# Patient Record
Sex: MALE | Race: WHITE | NOT HISPANIC OR LATINO | Employment: FULL TIME | URBAN - METROPOLITAN AREA
[De-identification: names, ages, dates, MRNs, and addresses within clinical notes are randomized per-mention and may not be internally consistent; named-entity substitution may affect disease eponyms.]

---

## 2018-07-03 ENCOUNTER — OFFICE VISIT (OUTPATIENT)
Dept: FAMILY MEDICINE CLINIC | Facility: CLINIC | Age: 43
End: 2018-07-03
Payer: COMMERCIAL

## 2018-07-03 VITALS
SYSTOLIC BLOOD PRESSURE: 150 MMHG | HEART RATE: 72 BPM | DIASTOLIC BLOOD PRESSURE: 88 MMHG | RESPIRATION RATE: 16 BRPM | HEIGHT: 72 IN | BODY MASS INDEX: 27.5 KG/M2 | OXYGEN SATURATION: 98 % | WEIGHT: 203 LBS | TEMPERATURE: 97.9 F

## 2018-07-03 DIAGNOSIS — R10.84 GENERALIZED ABDOMINAL PAIN: Primary | ICD-10-CM

## 2018-07-03 LAB
ALBUMIN SERPL BCP-MCNC: 4.1 G/DL (ref 3.5–5)
ALP SERPL-CCNC: 88 U/L (ref 46–116)
ALT SERPL W P-5'-P-CCNC: 74 U/L (ref 12–78)
AMYLASE SERPL-CCNC: 38 IU/L (ref 25–115)
ANION GAP SERPL CALCULATED.3IONS-SCNC: 8 MMOL/L (ref 4–13)
AST SERPL W P-5'-P-CCNC: 27 U/L (ref 5–45)
BASOPHILS # BLD AUTO: 0.04 THOUSANDS/ΜL (ref 0–0.1)
BASOPHILS NFR BLD AUTO: 1 % (ref 0–1)
BILIRUB SERPL-MCNC: 0.63 MG/DL (ref 0.2–1)
BUN SERPL-MCNC: 12 MG/DL (ref 5–25)
CALCIUM SERPL-MCNC: 9 MG/DL (ref 8.3–10.1)
CHLORIDE SERPL-SCNC: 106 MMOL/L (ref 100–108)
CO2 SERPL-SCNC: 26 MMOL/L (ref 21–32)
CREAT SERPL-MCNC: 0.88 MG/DL (ref 0.6–1.3)
EOSINOPHIL # BLD AUTO: 0.13 THOUSAND/ΜL (ref 0–0.61)
EOSINOPHIL NFR BLD AUTO: 2 % (ref 0–6)
ERYTHROCYTE [DISTWIDTH] IN BLOOD BY AUTOMATED COUNT: 12.3 % (ref 11.6–15.1)
GFR SERPL CREATININE-BSD FRML MDRD: 107 ML/MIN/1.73SQ M
GLUCOSE SERPL-MCNC: 103 MG/DL (ref 65–140)
HCT VFR BLD AUTO: 42.8 % (ref 36.5–49.3)
HGB BLD-MCNC: 14.4 G/DL (ref 12–17)
IMM GRANULOCYTES # BLD AUTO: 0.03 THOUSAND/UL (ref 0–0.2)
IMM GRANULOCYTES NFR BLD AUTO: 1 % (ref 0–2)
LYMPHOCYTES # BLD AUTO: 1.58 THOUSANDS/ΜL (ref 0.6–4.47)
LYMPHOCYTES NFR BLD AUTO: 26 % (ref 14–44)
MCH RBC QN AUTO: 31 PG (ref 26.8–34.3)
MCHC RBC AUTO-ENTMCNC: 33.6 G/DL (ref 31.4–37.4)
MCV RBC AUTO: 92 FL (ref 82–98)
MONOCYTES # BLD AUTO: 0.7 THOUSAND/ΜL (ref 0.17–1.22)
MONOCYTES NFR BLD AUTO: 11 % (ref 4–12)
NEUTROPHILS # BLD AUTO: 3.72 THOUSANDS/ΜL (ref 1.85–7.62)
NEUTS SEG NFR BLD AUTO: 59 % (ref 43–75)
NRBC BLD AUTO-RTO: 0 /100 WBCS
PLATELET # BLD AUTO: 200 THOUSANDS/UL (ref 149–390)
PMV BLD AUTO: 9.4 FL (ref 8.9–12.7)
POTASSIUM SERPL-SCNC: 4.1 MMOL/L (ref 3.5–5.3)
PROT SERPL-MCNC: 7.3 G/DL (ref 6.4–8.2)
RBC # BLD AUTO: 4.64 MILLION/UL (ref 3.88–5.62)
SODIUM SERPL-SCNC: 140 MMOL/L (ref 136–145)
WBC # BLD AUTO: 6.2 THOUSAND/UL (ref 4.31–10.16)

## 2018-07-03 PROCEDURE — 1036F TOBACCO NON-USER: CPT | Performed by: FAMILY MEDICINE

## 2018-07-03 PROCEDURE — 36415 COLL VENOUS BLD VENIPUNCTURE: CPT | Performed by: FAMILY MEDICINE

## 2018-07-03 PROCEDURE — 99203 OFFICE O/P NEW LOW 30 MIN: CPT | Performed by: FAMILY MEDICINE

## 2018-07-03 PROCEDURE — 82150 ASSAY OF AMYLASE: CPT | Performed by: FAMILY MEDICINE

## 2018-07-03 PROCEDURE — 85025 COMPLETE CBC W/AUTO DIFF WBC: CPT | Performed by: FAMILY MEDICINE

## 2018-07-03 PROCEDURE — 80053 COMPREHEN METABOLIC PANEL: CPT | Performed by: FAMILY MEDICINE

## 2018-07-03 NOTE — PROGRESS NOTES
Assessment/Plan:    Patient is a 68-year-old male with complaint of abdominal discomfort and feeling that he may have a hernia  He is tender on palpation and I do palpate a bulging or protrudes above the umbilicus  It does not really appear as a ventral hernia  I will 1st check blood work and then we may need to order a CT scan  Diagnoses and all orders for this visit:    Generalized abdominal pain  -     CBC and differential  -     Comprehensive metabolic panel  -     Amylase          Subjective:      Patient ID: Derick Suero is a 38 y o  male  Patient complains of abdominal burring, bloating  He also feels two hernias in the umbilicus  He used to have regular BM's but past two months, alternates between diarrhea and constipation  Drinks less than four alcoholic drinks a week  Stopped smoking 8 years ago  Grandmother and father have history of diverticulitis  Both of Mom's parents had heart disease and mother had breast cancer  The following portions of the patient's history were reviewed and updated as appropriate: allergies, current medications, past family history, past medical history, past social history, past surgical history and problem list     Review of Systems   Constitutional: Negative for chills and fever  HENT: Negative for congestion and sinus pain  Respiratory: Negative for cough  Gastrointestinal: Positive for abdominal distention, constipation and diarrhea  Negative for nausea and vomiting  Genitourinary: Negative for difficulty urinating  Skin: Negative  Neurological: Negative for dizziness and headaches  Hematological: Negative  Psychiatric/Behavioral: Negative            Objective:      /88 (BP Location: Left arm, Patient Position: Sitting, Cuff Size: Standard)   Pulse 72   Temp 97 9 °F (36 6 °C) (Tympanic)   Resp 16   Ht 6' (1 829 m)   Wt 92 1 kg (203 lb)   SpO2 98%   BMI 27 53 kg/m²          Physical Exam   Constitutional: He is oriented to person, place, and time  He appears well-developed  No distress  Neck: No thyromegaly present  Cardiovascular: Normal rate, regular rhythm and normal heart sounds  Pulmonary/Chest: Effort normal and breath sounds normal    Abdominal: Soft  He exhibits mass (protruberance of mid-abdomen palpated)  There is tenderness (Tender on palpationn of upper abdomen  )  Lymphadenopathy:     He has no cervical adenopathy  Neurological: He is alert and oriented to person, place, and time  Skin: Skin is warm  Psychiatric: He has a normal mood and affect  Nursing note and vitals reviewed

## 2018-07-05 DIAGNOSIS — R22.2 ABDOMINAL WALL MASS: ICD-10-CM

## 2018-07-05 DIAGNOSIS — R19.4 CHANGE IN BOWEL HABITS: ICD-10-CM

## 2018-07-05 DIAGNOSIS — R10.33 PERIUMBILICAL ABDOMINAL PAIN: Primary | ICD-10-CM

## 2018-07-10 ENCOUNTER — DOCUMENTATION (OUTPATIENT)
Dept: FAMILY MEDICINE CLINIC | Facility: CLINIC | Age: 43
End: 2018-07-10

## 2018-07-10 NOTE — PROGRESS NOTES
Isreal Sosa # [id number], EXP 8/8/18  PT TO SCHEDULE AT [facility], BARIUM UP FRONT FOR P/U

## 2018-07-13 ENCOUNTER — HOSPITAL ENCOUNTER (OUTPATIENT)
Dept: CT IMAGING | Facility: HOSPITAL | Age: 43
Discharge: HOME | End: 2018-07-13
Payer: COMMERCIAL

## 2018-07-13 DIAGNOSIS — R10.33 PERIUMBILICAL ABDOMINAL PAIN: ICD-10-CM

## 2018-07-13 DIAGNOSIS — R22.2 ABDOMINAL WALL MASS: ICD-10-CM

## 2018-07-13 DIAGNOSIS — R19.4 CHANGE IN BOWEL HABITS: ICD-10-CM

## 2018-07-13 PROCEDURE — 74177 CT ABD & PELVIS W/CONTRAST: CPT

## 2018-07-13 RX ADMIN — IOHEXOL 100 ML: 350 INJECTION, SOLUTION INTRAVENOUS at 19:17

## 2018-07-18 ENCOUNTER — TELEPHONE (OUTPATIENT)
Dept: FAMILY MEDICINE CLINIC | Facility: CLINIC | Age: 43
End: 2018-07-18

## 2018-07-18 DIAGNOSIS — K42.9 UMBILICAL HERNIA WITHOUT OBSTRUCTION AND WITHOUT GANGRENE: Primary | ICD-10-CM

## 2018-08-21 ENCOUNTER — OFFICE VISIT (OUTPATIENT)
Dept: SURGERY | Facility: MEDICAL CENTER | Age: 43
End: 2018-08-21
Payer: COMMERCIAL

## 2018-08-21 VITALS
BODY MASS INDEX: 27.5 KG/M2 | HEIGHT: 72 IN | TEMPERATURE: 97.3 F | SYSTOLIC BLOOD PRESSURE: 146 MMHG | DIASTOLIC BLOOD PRESSURE: 90 MMHG | WEIGHT: 203 LBS

## 2018-08-21 DIAGNOSIS — K42.9 UMBILICAL HERNIA WITHOUT OBSTRUCTION AND WITHOUT GANGRENE: ICD-10-CM

## 2018-08-21 PROCEDURE — 99243 OFF/OP CNSLTJ NEW/EST LOW 30: CPT | Performed by: SURGERY

## 2018-08-21 NOTE — PROGRESS NOTES
Assessment/Plan: Maryan Tobar is a 38year old male who presents today, per referral by Dr Sherrill Meadows, for consultation regarding an umbilical hernia  CT scan done 7/13/18  Physical exam revealed a small fat-containing umbilical hernia  CT scan shows there are no intestines in the hernia at this time  Discussed risks, benefits, and alternatives to open umbilical hernia repair with or without mesh  Explained the surgery as well as pre- and post-procedural protocol and restrictions  No heavy lifting greater than 20 pounds and no strenuous activity for a full month after surgery  Encouraged him to walk to aid in his recovery  He will consider his options  He knows to call if any questions or concerns arise  Abdominal distention- Explained that his bloating after eating is likely related to diet, and would likely not be alleviated by hernia repair  No problem-specific Assessment & Plan notes found for this encounter  Diagnoses and all orders for this visit:    Umbilical hernia without obstruction and without gangrene  -     Ambulatory referral to General Surgery          Subjective:      Patient ID: Maryan Tobar is a 38 y o  male  Maryan Tobar is a 38year old male who presents today, per referral by Dr Sherrill Meadows, for consultation regarding an umbilical hernia  CT scan done 7/13/18  He has had it for at least 2 years  He reports it used to go in and out, but now it's usually out, and it is causing him discomfort  Abdominal distention- He also notes that when he eats he becomes very bloated  He says that it is becoming a bigger problem for him as his hernia gets larger  He has not noticed an association with food  He also feels a pain in his left lower quadrant occasionally  When his abdomen is pushed on he reports he feels "something pushing back"  He is "unable to eat a lot" now  The only change in his diet has been a reduction of animal products in the last year  At first anything made him bloat   Now even less food makes him bloat  The following portions of the patient's history were reviewed and updated as appropriate: allergies, current medications, past family history, past medical history, past social history, past surgical history and problem list     Review of Systems   Constitutional: Negative  HENT: Negative  Eyes: Negative  Respiratory: Negative  Cardiovascular: Negative  Gastrointestinal: Positive for abdominal distention and abdominal pain  Endocrine: Negative  Genitourinary: Negative  Musculoskeletal: Negative  Skin: Negative  Allergic/Immunologic: Negative  Neurological: Negative  Hematological: Negative  Psychiatric/Behavioral: Negative  All other systems reviewed and are negative  Objective:      /90   Temp (!) 97 3 °F (36 3 °C)   Ht 6' (1 829 m)   Wt 92 1 kg (203 lb)   BMI 27 53 kg/m²          Physical Exam   Constitutional: He is oriented to person, place, and time  He appears well-developed and well-nourished  No distress  HENT:   Head: Normocephalic and atraumatic  Right Ear: External ear normal    Left Ear: External ear normal    Nose: Nose normal    Mouth/Throat: Oropharynx is clear and moist  No oropharyngeal exudate  Eyes: Conjunctivae and EOM are normal  Right eye exhibits no discharge  Left eye exhibits no discharge  No scleral icterus  Neck: Normal range of motion  Neck supple  No JVD present  No tracheal deviation present  No thyromegaly present  Cardiovascular: Normal rate, regular rhythm, normal heart sounds and intact distal pulses  Exam reveals no gallop and no friction rub  No murmur heard  Pulmonary/Chest: Effort normal and breath sounds normal  No stridor  No respiratory distress  He has no wheezes  He has no rales  He exhibits no tenderness  Abdominal: Soft  Bowel sounds are normal  He exhibits no distension and no mass  There is no tenderness  There is no rebound and no guarding     Small umbilical hernia  Musculoskeletal: Normal range of motion  He exhibits no edema, tenderness or deformity  Lymphadenopathy:     He has no cervical adenopathy  Neurological: He is alert and oriented to person, place, and time  No cranial nerve deficit  Coordination normal    Skin: Skin is dry  No rash noted  He is not diaphoretic  No erythema  No pallor  Psychiatric: He has a normal mood and affect  His behavior is normal  Thought content normal    Nursing note and vitals reviewed  By signing my name below, Veronica Sandoval, attest that this documentation has been prepared under the direction and in the presence of Robby Montgomery MD  Electronically Signed: Mark Mig[address] 8/21/2018  Gildardo Carrillo, personally performed the services described in this documentation  All medical record entries made by the scribe were at my direction and in my presence  I have reviewed the chart and discharge instructions (if applicable) and agree that the record reflects my personal performance and is accurate and complete  Robby Montgomery MD  8/21/2018

## 2018-08-21 NOTE — LETTER
August 22, 2018     Sheri Figueroa DO  [address]    Patient: Darlene Rios   YOB: 1975   Date of Visit: 8/21/2018       Dear Dr Aryan Bolton: Thank you for referring Darlene Rios to me for evaluation  Below are my notes for this consultation  If you have questions, please do not hesitate to call me  I look forward to following your patient along with you  Sincerely,        Estee Menon MD        CC: No Recipients  Sander Lazo  8/21/2018  1:40 PM  Sign at close encounter  Assessment/Plan:    No problem-specific Assessment & Plan notes found for this encounter  Diagnoses and all orders for this visit:    Umbilical hernia without obstruction and without gangrene  -     Ambulatory referral to General Surgery          Subjective:      Patient ID: Darlene Rios is a 38 y o  male  Darlene Rios is a 38year old male who presents today, per referral by Dr Sheri Figueroa, for consultation re        The following portions of the patient's history were reviewed and updated as appropriate: allergies, current medications, past family history, past medical history, past social history, past surgical history and problem list     Review of Systems      Objective: There were no vitals taken for this visit  Physical Exam      By signing my name below, Elin Jansen, attsharmila that this documentation has been prepared under the direction and in the presence of Estee Menon MD  Electronically Signed: Sander Lazo 8/21/2018  Dorene Rothman, personally performed the services described in this documentation  All medical record entries made by the sander were at my direction and in my presence  I have reviewed the chart and discharge instructions (if applicable) and agree that the record reflects my personal performance and is accurate and complete  Estee Menon MD  8/21/2018

## 2021-01-13 ENCOUNTER — OFFICE VISIT (OUTPATIENT)
Dept: FAMILY MEDICINE CLINIC | Facility: CLINIC | Age: 46
End: 2021-01-13
Payer: COMMERCIAL

## 2021-01-13 VITALS
WEIGHT: 214.4 LBS | OXYGEN SATURATION: 97 % | HEART RATE: 97 BPM | BODY MASS INDEX: 29.04 KG/M2 | HEIGHT: 72 IN | DIASTOLIC BLOOD PRESSURE: 88 MMHG | SYSTOLIC BLOOD PRESSURE: 140 MMHG | TEMPERATURE: 98.6 F | RESPIRATION RATE: 17 BRPM

## 2021-01-13 DIAGNOSIS — I49.9 CARDIAC ARRHYTHMIA, UNSPECIFIED CARDIAC ARRHYTHMIA TYPE: ICD-10-CM

## 2021-01-13 DIAGNOSIS — R93.5 ABNORMAL CT OF THE ABDOMEN: ICD-10-CM

## 2021-01-13 DIAGNOSIS — Z13.1 SCREENING FOR DIABETES MELLITUS: ICD-10-CM

## 2021-01-13 DIAGNOSIS — M25.562 CHRONIC PAIN OF LEFT KNEE: ICD-10-CM

## 2021-01-13 DIAGNOSIS — Z13.220 ENCOUNTER FOR LIPID SCREENING FOR CARDIOVASCULAR DISEASE: ICD-10-CM

## 2021-01-13 DIAGNOSIS — G89.29 CHRONIC PAIN OF LEFT KNEE: ICD-10-CM

## 2021-01-13 DIAGNOSIS — Z13.6 ENCOUNTER FOR LIPID SCREENING FOR CARDIOVASCULAR DISEASE: ICD-10-CM

## 2021-01-13 DIAGNOSIS — M72.2 PLANTAR FASCIITIS OF RIGHT FOOT: ICD-10-CM

## 2021-01-13 DIAGNOSIS — M25.50 MULTIPLE JOINT PAIN: ICD-10-CM

## 2021-01-13 DIAGNOSIS — R01.1 HEART MURMUR: ICD-10-CM

## 2021-01-13 DIAGNOSIS — R14.0 ABDOMINAL BLOATING: ICD-10-CM

## 2021-01-13 DIAGNOSIS — Z00.00 ANNUAL PHYSICAL EXAM: Primary | ICD-10-CM

## 2021-01-13 DIAGNOSIS — R59.1 LYMPHADENOPATHY: ICD-10-CM

## 2021-01-13 PROCEDURE — 99396 PREV VISIT EST AGE 40-64: CPT | Performed by: FAMILY MEDICINE

## 2021-01-13 NOTE — PATIENT INSTRUCTIONS

## 2021-01-13 NOTE — PROGRESS NOTES
[facility]    NAME: Alberta Reilly  AGE: 39 y o  SEX: male  : 1975     DATE: 2021     Assessment and Plan:   Patient is a 39year old male scheduled for a well visit  He has multiple issues  He had ectopy on exam and a heart murmur - I have ordered an echo and Holter  I have also ordered fasting blood work  He also complains of bloating - US of abdomen ordered  He has complaints of multiple joint pain - plantar fasciitis, knee pain - checking inflammatory marker  Problem List Items Addressed This Visit     None      Visit Diagnoses     Annual physical exam    -  Primary    Cardiac arrhythmia, unspecified cardiac arrhythmia type        Relevant Orders    POCT ECG (Completed)    Heart murmur        Relevant Orders    Echo complete with contrast if indicated    Abdominal bloating        Relevant Orders    US abdomen complete (Completed)    Comprehensive metabolic panel (Completed)    Abnormal CT of the abdomen        Relevant Orders    US abdomen complete (Completed)    Lymphadenopathy        Relevant Orders    Comprehensive metabolic panel (Completed)    TSH, 3rd generation with Free T4 reflex (Completed)    CBC and differential (Completed)    Multiple joint pain        Relevant Orders    C-reactive protein (Completed)    Encounter for lipid screening for cardiovascular disease        Relevant Orders    Lipid Panel with Direct LDL reflex (Completed)    Screening for diabetes mellitus        Plantar fasciitis of right foot        Relevant Orders    Ambulatory referral to Physical Therapy    Chronic pain of left knee        Relevant Orders    C-reactive protein (Completed)    Ambulatory referral to Physical Therapy          Immunizations and preventive care screenings were discussed with patient today  Appropriate education was printed on patient's after visit summary      Counseling:  Alcohol/drug use: discussed moderation in alcohol intake, the recommendations for healthy alcohol use, and avoidance of illicit drug use  Dental Health: discussed importance of regular tooth brushing, flossing, and dental visits  · Exercise: the importance of regular exercise/physical activity was discussed  Recommend exercise 3-5 times per week for at least 30 minutes  BMI Counseling: Body mass index is 29 49 kg/m²  The BMI is above normal  Nutrition recommendations include encouraging healthy choices of fruits and vegetables, moderation in carbohydrate intake, increasing intake of lean protein and reducing intake of saturated and trans fat  Exercise recommendations include moderate physical activity 150 minutes/week  No pharmacotherapy was ordered  Patient referred to PCP due to patient being overweight  No follow-ups on file  Chief Complaint:     Chief Complaint   Patient presents with    Physical Exam     rib pain      History of Present Illness:     Adult Annual Physical   Patient here for a comprehensive physical exam  The patient reports problems - rib pain last week     Also "kidney pain" left worse than right  Mole on left shoulder that was bleeding  Also pain in upper chest   Left knee pain - swelling, crunching  Also thinks he has plantar fasciitis  Works in lab at [employer]  Diet and Physical Activity  · Diet/Nutrition: frequent junk food  Eating a lot of fast food  He does go back between vegetarian and meat eating  · Exercise: walking  Walking  Depression Screening  PHQ-9 Depression Screening    PHQ-9:   Frequency of the following problems over the past two weeks:      Little interest or pleasure in doing things: 0 - not at all  Feeling down, depressed, or hopeless: 0 - not at all  PHQ-2 Score: 0       General Health  · Sleep:    Night sweats  · Hearing: normal - bilateral   · Vision: no vision problems     · Dental:           Health  · Symptoms include: none     Review of Systems:     Review of Systems Constitutional: Negative for chills and fever  HENT: Negative for congestion and sore throat  Respiratory: Negative for chest tightness  Cardiovascular: Negative for chest pain and palpitations  Gastrointestinal: Positive for abdominal distention  Negative for abdominal pain, constipation, diarrhea and nausea  Genitourinary: Negative for difficulty urinating  Musculoskeletal: Positive for arthralgias (knee - left, right foot, bilateral shoulder)  Skin: Negative  Neurological: Negative for dizziness and headaches  Psychiatric/Behavioral: Negative  Past Medical History:     No past medical history on file  Past Surgical History:     Past Surgical History:   Procedure Laterality Date    WISDOM TOOTH EXTRACTION        Family History:     No family history on file     Social History:        Social History     Socioeconomic History    Marital status: /Civil Union     Spouse name: Not on file    Number of children: Not on file    Years of education: Not on file    Highest education level: Not on file   Occupational History    Not on file   Social Needs    Financial resource strain: Not on file    Food insecurity     Worry: Not on file     Inability: Not on file   [employer] needs     Medical: Not on file     Non-medical: Not on file   Tobacco Use    Smoking status: Former Smoker    Smokeless tobacco: Former User   Substance and Sexual Activity    Alcohol use: Yes     Comment: Ocassionally & Socially    Drug use: No    Sexual activity: Not on file   Lifestyle    Physical activity     Days per week: Not on file     Minutes per session: Not on file    Stress: Not on file   Relationships    Social connections     Talks on phone: Not on file     Gets together: Not on file     Attends Restorationism service: Not on file     Active member of club or organization: Not on file     Attends meetings of clubs or organizations: Not on file     Relationship status: Not on file   Starr Smith Intimate partner violence     Fear of current or ex partner: Not on file     Emotionally abused: Not on file     Physically abused: Not on file     Forced sexual activity: Not on file   Other Topics Concern    Not on file   Social History Narrative    Not on file      Current Medications:     No current outpatient medications on file  No current facility-administered medications for this visit  Allergies:     No Known Allergies   Physical Exam:     /88 (BP Location: Left arm, Patient Position: Sitting)   Pulse 97   Temp 98 6 °F (37 °C) (Tympanic)   Resp 17   Ht 5' 11 5" (1 816 m)   Wt 97 3 kg (214 lb 6 4 oz)   SpO2 97%   BMI 29 49 kg/m²     Physical Exam  Constitutional:       General: He is not in acute distress  Appearance: He is overweight  HENT:      Right Ear: Tympanic membrane normal       Left Ear: Tympanic membrane normal       Mouth/Throat:      Pharynx: No oropharyngeal exudate  Neck:      Thyroid: No thyromegaly  Cardiovascular:      Rate and Rhythm: Normal rate and regular rhythm  Heart sounds: Murmur present  Comments: ectopy  Pulmonary:      Effort: Pulmonary effort is normal       Breath sounds: Normal breath sounds  Abdominal:      General: Bowel sounds are normal       Palpations: Abdomen is soft  Musculoskeletal:         General: No swelling or deformity  Right lower leg: No edema  Left lower leg: No edema  Lymphadenopathy:      Cervical: No cervical adenopathy  Skin:     General: Skin is warm and dry  Neurological:      Mental Status: He is alert and oriented to person, place, and time        Gait: Gait normal       Deep Tendon Reflexes: Reflexes normal    Psychiatric:         Mood and Affect: Mood normal           Chasity Thakur, DO  [address] [facility]

## 2021-01-14 PROCEDURE — 93000 ELECTROCARDIOGRAM COMPLETE: CPT | Performed by: FAMILY MEDICINE

## 2021-01-19 NOTE — PROGRESS NOTES
PT Evaluation     Today's date: 2021  Patient name: Mansi Mistry  : 1975  MRN: [id number]  Referring provider: Beth Craig DO  Dx:   Encounter Diagnosis     ICD-10-CM    1  Plantar fasciitis of right foot  M72 2 Ambulatory referral to Physical Therapy   2  Chronic pain of left knee  M25 562 Ambulatory referral to Physical Therapy    G89 29                   Assessment  Assessment details: Mansi Mistry is a 39 y o  male presenting to physical therapy with left knee and right plantar fascia pain, decreased range of motion, decreased strength, gait/balance dysfunction and decreased activity tolerance  Assessment reveals significant plantar fascia tightness as per windlass effect and posterior chain tightness  In addition, patella dysfunction present with excessive lateral tracking and VMO weakness  Secondary to these impairments, patient has increased difficulty performing ADL's, household chores and  work related tasks  Lucius Wheat would benefit from skilled PT to address these issues and maximize function  Thank you for the referral     Impairments: abnormal gait, abnormal muscle tone, abnormal or restricted ROM, abnormal movement, activity intolerance, impaired balance, impaired physical strength, lacks appropriate home exercise program, pain with function and weight-bearing intolerance  Understanding of Dx/Px/POC: excellent  Goals  STG (4 weeks)  1  Patient will be independent with HEP  2  Decrease pain at worst by 2 points on NPRS  3  Increase DF of the ankle with knee extended by 5 degrees on the right for improved plantar fascia length and tolerance to stance with navicular drop  4  Patient will demonstrate ability to ambulate in the AM without pain  LTG (8 weeks)  1  Decrease pain at worst from 4 points on NPRS  2  Increase DF of the ankle to equal with knee flexed and extended for improved posterior chain length during stance  3   Increase left hip abduction strength by 1 MMT grade for decreased dynamic valgus  4  Increase FOTO > or equal to expected outcome  5  Patient will navigate stairs without pain    Plan  Patient would benefit from: skilled PT  Planned therapy interventions: joint mobilization, manual therapy, neuromuscular re-education, patient education, strengthening, stretching, therapeutic exercise, home exercise program, ADL training and balance  Frequency: 2x week  Duration in weeks: 8  Treatment plan discussed with: patient        Subjective Evaluation    History of Present Illness  Mechanism of injury: Patient present to outpatient PT secondary to the onset of left knee pain and right plantar fascia pain  Patients pain began when 2 years prior with insidious onset (regarding the right foot)  Patient states that years ago he injured his right knee playing basketball and felt a pop in the knee but noticed improved strength thereafter  He was fine until recently but noticed a pop while running in the park while hyperextending  Patient states that his primary symptom is pain with steps and instability  Patient states that his pain is worsened by AM weight bearing and improved with rest   Patient works at [employer] and has difficulty performing his usual tasks as a result of the pain as he is on his feet a lot - specifically wearing steel tipped boots  His goals for PT are to decrease his pain to improve in iADL's, leisure and work related functions            Recurrent probem    Quality of life: excellent    Pain  Current pain ratin  At best pain ratin  At worst pain ratin  Quality: sharp and dull ache  Relieving factors: relaxation and rest  Aggravating factors: stair climbing, walking and lifting  Progression: worsening    Social Support  Steps to enter house: yes  Stairs in house: yes   Lives in: multiple-level home    Employment status: working    Diagnostic Tests  No diagnostic tests performed  Treatments  No previous or current treatments  Patient Goals  Patient goals for therapy: increased strength, independence with ADLs/IADLs, return to sport/leisure activities, increased motion and decreased pain          Objective     Tenderness     Right Ankle/Foot   Tenderness in the plantar fascia  Neurological Testing     Sensation     Ankle/Foot   Left Ankle/Foot   Intact: light touch    Right Ankle/Foot   Intact: light touch     Reflexes   Left   Patellar (L4): brisk (3+)  Achilles (S1): brisk (3+)  Clonus sign: negative    Right   Patellar (L4): brisk (3+)  Achilles (S1): brisk (3+)  Clonus sign: negative    Active Range of Motion     Right Knee   Flexion: WFL  Extension: WFL    Right Ankle/Foot   Dorsiflexion (ke): 8 degrees   Dorsiflexion (kf): 14 degrees     Passive Range of Motion     Right Ankle/Foot    Dorsiflexion (ke): 10 degrees   Dorsiflexion (kf): 16 degrees      Joint Play     Right Ankle/Foot  Hypomobile in the talocrural joint  Strength/Myotome Testing     Right Knee   Flexion: 5  Extension: 5    Right Ankle/Foot   Dorsiflexion: 5  Plantar flexion: 4+  Great toe extension: 4+    Additional Strength Details  Hip abduction: 3+/5 on R    Tests     Right Knee   Positive patellar compression and patella-femoral grind  Negative anterior drawer, anterior Lachman, lateral Justino, medial Justino, patellar apprehension, posterior drawer, posterior Lachman, valgus stress test at 0 degrees and varus stress test at 0 degrees  Right Ankle/Foot   Positive for navicular drop and windlass         Flowsheet Rows      Most Recent Value   PT/OT G-Codes   Current Score  62   Projected Score  77             Precautions: None      Manuals 1/19            R plantar fascia release             R gastroc release             R talocrural joint release             L patella taping (medial glide)             Neuro Re-Ed                                                                                                        Ther Ex             Standing gastroc strech 4x30" on R Standing soleus stretch 4x30" on R            R plantar fascia stretch 4x30"            R plantar fascia release             SLR             Standing hip abduction             Monster walks                          Ther Activity                                       Gait Training                                       Modalities

## 2021-01-20 ENCOUNTER — EVALUATION (OUTPATIENT)
Dept: PHYSICAL THERAPY | Facility: CLINIC | Age: 46
End: 2021-01-20
Payer: COMMERCIAL

## 2021-01-20 ENCOUNTER — LAB (OUTPATIENT)
Dept: LAB | Facility: CLINIC | Age: 46
End: 2021-01-20
Payer: COMMERCIAL

## 2021-01-20 DIAGNOSIS — M25.562 CHRONIC PAIN OF LEFT KNEE: ICD-10-CM

## 2021-01-20 DIAGNOSIS — G89.29 CHRONIC PAIN OF LEFT KNEE: ICD-10-CM

## 2021-01-20 DIAGNOSIS — M72.2 PLANTAR FASCIITIS OF RIGHT FOOT: Primary | ICD-10-CM

## 2021-01-20 DIAGNOSIS — M25.50 MULTIPLE JOINT PAIN: ICD-10-CM

## 2021-01-20 DIAGNOSIS — R59.1 LYMPHADENOPATHY: ICD-10-CM

## 2021-01-20 DIAGNOSIS — Z13.6 ENCOUNTER FOR LIPID SCREENING FOR CARDIOVASCULAR DISEASE: ICD-10-CM

## 2021-01-20 DIAGNOSIS — R14.0 ABDOMINAL BLOATING: ICD-10-CM

## 2021-01-20 DIAGNOSIS — Z13.220 ENCOUNTER FOR LIPID SCREENING FOR CARDIOVASCULAR DISEASE: ICD-10-CM

## 2021-01-20 LAB
ALBUMIN SERPL BCP-MCNC: 4.3 G/DL (ref 3.5–5)
ALP SERPL-CCNC: 86 U/L (ref 46–116)
ALT SERPL W P-5'-P-CCNC: 60 U/L (ref 12–78)
ANION GAP SERPL CALCULATED.3IONS-SCNC: 5 MMOL/L (ref 4–13)
AST SERPL W P-5'-P-CCNC: 21 U/L (ref 5–45)
BASOPHILS # BLD AUTO: 0.04 THOUSANDS/ΜL (ref 0–0.1)
BASOPHILS NFR BLD AUTO: 1 % (ref 0–1)
BILIRUB SERPL-MCNC: 0.57 MG/DL (ref 0.2–1)
BUN SERPL-MCNC: 11 MG/DL (ref 5–25)
CALCIUM SERPL-MCNC: 9.4 MG/DL (ref 8.3–10.1)
CHLORIDE SERPL-SCNC: 104 MMOL/L (ref 100–108)
CHOLEST SERPL-MCNC: 210 MG/DL (ref 50–200)
CO2 SERPL-SCNC: 30 MMOL/L (ref 21–32)
CREAT SERPL-MCNC: 1.03 MG/DL (ref 0.6–1.3)
CRP SERPL QL: 3.6 MG/L
EOSINOPHIL # BLD AUTO: 0.06 THOUSAND/ΜL (ref 0–0.61)
EOSINOPHIL NFR BLD AUTO: 1 % (ref 0–6)
ERYTHROCYTE [DISTWIDTH] IN BLOOD BY AUTOMATED COUNT: 11.7 % (ref 11.6–15.1)
GFR SERPL CREATININE-BSD FRML MDRD: 89 ML/MIN/1.73SQ M
GLUCOSE P FAST SERPL-MCNC: 95 MG/DL (ref 65–99)
HCT VFR BLD AUTO: 45.5 % (ref 36.5–49.3)
HDLC SERPL-MCNC: 37 MG/DL
HGB BLD-MCNC: 15.4 G/DL (ref 12–17)
IMM GRANULOCYTES # BLD AUTO: 0.01 THOUSAND/UL (ref 0–0.2)
IMM GRANULOCYTES NFR BLD AUTO: 0 % (ref 0–2)
LDLC SERPL CALC-MCNC: 143 MG/DL (ref 0–100)
LYMPHOCYTES # BLD AUTO: 1.69 THOUSANDS/ΜL (ref 0.6–4.47)
LYMPHOCYTES NFR BLD AUTO: 28 % (ref 14–44)
MCH RBC QN AUTO: 31.8 PG (ref 26.8–34.3)
MCHC RBC AUTO-ENTMCNC: 33.8 G/DL (ref 31.4–37.4)
MCV RBC AUTO: 94 FL (ref 82–98)
MONOCYTES # BLD AUTO: 0.47 THOUSAND/ΜL (ref 0.17–1.22)
MONOCYTES NFR BLD AUTO: 8 % (ref 4–12)
NEUTROPHILS # BLD AUTO: 3.76 THOUSANDS/ΜL (ref 1.85–7.62)
NEUTS SEG NFR BLD AUTO: 62 % (ref 43–75)
NRBC BLD AUTO-RTO: 0 /100 WBCS
PLATELET # BLD AUTO: 225 THOUSANDS/UL (ref 149–390)
PMV BLD AUTO: 10 FL (ref 8.9–12.7)
POTASSIUM SERPL-SCNC: 4.4 MMOL/L (ref 3.5–5.3)
PROT SERPL-MCNC: 7.7 G/DL (ref 6.4–8.2)
RBC # BLD AUTO: 4.84 MILLION/UL (ref 3.88–5.62)
SODIUM SERPL-SCNC: 139 MMOL/L (ref 136–145)
TRIGL SERPL-MCNC: 152 MG/DL
TSH SERPL DL<=0.05 MIU/L-ACNC: 1.82 UIU/ML (ref 0.36–3.74)
WBC # BLD AUTO: 6.03 THOUSAND/UL (ref 4.31–10.16)

## 2021-01-20 PROCEDURE — 86140 C-REACTIVE PROTEIN: CPT

## 2021-01-20 PROCEDURE — 97162 PT EVAL MOD COMPLEX 30 MIN: CPT | Performed by: PHYSICAL THERAPIST

## 2021-01-20 PROCEDURE — 80061 LIPID PANEL: CPT

## 2021-01-20 PROCEDURE — 36415 COLL VENOUS BLD VENIPUNCTURE: CPT

## 2021-01-20 PROCEDURE — 84443 ASSAY THYROID STIM HORMONE: CPT

## 2021-01-20 PROCEDURE — 85025 COMPLETE CBC W/AUTO DIFF WBC: CPT

## 2021-01-20 PROCEDURE — 80053 COMPREHEN METABOLIC PANEL: CPT

## 2021-01-21 ENCOUNTER — HOSPITAL ENCOUNTER (OUTPATIENT)
Dept: ULTRASOUND IMAGING | Facility: MEDICAL CENTER | Age: 46
Discharge: HOME | End: 2021-01-21
Payer: COMMERCIAL

## 2021-01-21 DIAGNOSIS — R93.5 ABNORMAL CT OF THE ABDOMEN: ICD-10-CM

## 2021-01-21 DIAGNOSIS — R14.0 ABDOMINAL BLOATING: ICD-10-CM

## 2021-01-21 PROCEDURE — 76700 US EXAM ABDOM COMPLETE: CPT

## 2021-02-03 ENCOUNTER — OFFICE VISIT (OUTPATIENT)
Dept: PHYSICAL THERAPY | Facility: CLINIC | Age: 46
End: 2021-02-03
Payer: COMMERCIAL

## 2021-02-03 DIAGNOSIS — G89.29 CHRONIC PAIN OF LEFT KNEE: ICD-10-CM

## 2021-02-03 DIAGNOSIS — M25.562 CHRONIC PAIN OF LEFT KNEE: ICD-10-CM

## 2021-02-03 DIAGNOSIS — M72.2 PLANTAR FASCIITIS OF RIGHT FOOT: Primary | ICD-10-CM

## 2021-02-03 PROCEDURE — 97140 MANUAL THERAPY 1/> REGIONS: CPT | Performed by: PHYSICAL THERAPIST

## 2021-02-03 PROCEDURE — 97110 THERAPEUTIC EXERCISES: CPT | Performed by: PHYSICAL THERAPIST

## 2021-02-03 NOTE — PROGRESS NOTES
Daily Note     Today's date: 2/3/2021  Patient name: Colt Tucker  : 1975  MRN: [id number]  Referring provider: Ariel Walker DO  Dx:   Encounter Diagnosis     ICD-10-CM    1  Plantar fasciitis of right foot  M72 2    2  Chronic pain of left knee  M25 562     G89 29                   Subjective: Patient reports significant reduction in knee pain following patella taping and notes some reduction in plantar fascia pain  Objective: See treatment diary below      Assessment: Patient demonstrating compliance with his HEP as per appropriate form with stretching  Secondary to relief with knee taping, patient was educated in purchasing a J brace for added patellar stability  Good tolerance with all manuals and updated HEP accordingly  Plan: Continue per plan of care        Precautions: None      Manuals 1/19 2/3           R plantar fascia release  5'           R gastroc release  5'           R talocrural joint mobilization  Grade IV           L patella taping (medial glide)  performed           Neuro Re-Ed                                                                                                        Ther Ex             Standing gastroc strech 4x30" on R 4x30" on R           Standing soleus stretch 4x30" on R 4x30" on R           R plantar fascia stretch 4x30" 4x30"           R plantar fascia release             SLR  3#  3x10           Standing hip abduction  BTB  3x10 B/L           Monster walks  BTB  10 steps x4                        Ther Activity                                       Gait Training                                       Modalities

## 2021-02-17 ENCOUNTER — OFFICE VISIT (OUTPATIENT)
Dept: PHYSICAL THERAPY | Facility: CLINIC | Age: 46
End: 2021-02-17
Payer: COMMERCIAL

## 2021-02-17 DIAGNOSIS — M25.562 CHRONIC PAIN OF LEFT KNEE: ICD-10-CM

## 2021-02-17 DIAGNOSIS — M72.2 PLANTAR FASCIITIS OF RIGHT FOOT: Primary | ICD-10-CM

## 2021-02-17 DIAGNOSIS — G89.29 CHRONIC PAIN OF LEFT KNEE: ICD-10-CM

## 2021-02-17 PROCEDURE — 97140 MANUAL THERAPY 1/> REGIONS: CPT | Performed by: PHYSICAL THERAPIST

## 2021-02-17 PROCEDURE — 97110 THERAPEUTIC EXERCISES: CPT | Performed by: PHYSICAL THERAPIST

## 2021-02-17 NOTE — PROGRESS NOTES
Daily Note     Today's date: 2021  Patient name: Alberta Reilly  : 1975  MRN: [id number]  Referring provider: Kaley Seaman DO  Dx:   Encounter Diagnosis     ICD-10-CM    1  Plantar fasciitis of right foot  M72 2    2  Chronic pain of left knee  M25 562     G89 29                   Subjective: Patient reports mostly non compliance with his HEP and states that his symptoms are mildly improving anyway  Objective: See treatment diary below      Assessment: Patient demonstrating continued right gastroc/soleus tension and tightness of the plantar fascia  Patient was educated to increased frequency of his HEP for greatest relief of plantar fascia related pain  Patient remains challenged by proximal LE strengthening and transition to function to prevent dynamic valgus and lateral patellar tracking  Plan: Continue per plan of care        Precautions: None      Manuals 1/19 2/3 2/17          R plantar fascia release  5' 5'          R gastroc release  5' 5'          R talocrural joint mobilization  Grade IV Grade IV          L patella taping (medial glide)  performed performed          Neuro Re-Ed                                                                                                        Ther Ex             Standing gastroc strech 4x30" on R 4x30" on R 4x30" R          Standing soleus stretch 4x30" on R 4x30" on R 4x30" R          R plantar fascia stretch 4x30" 4x30" 4x30"          R plantar fascia release             SLR  3#  3x10 3#  3x10          Standing hip abduction  BTB  3x10 B/L BTB  3x10 B/L          Monster walks  BTB  10 steps x4 BTB  10 steps x4                       Ther Activity                                       Gait Training                                       Modalities

## 2021-03-03 ENCOUNTER — OFFICE VISIT (OUTPATIENT)
Dept: PHYSICAL THERAPY | Facility: CLINIC | Age: 46
End: 2021-03-03
Payer: COMMERCIAL

## 2021-03-03 DIAGNOSIS — G89.29 CHRONIC PAIN OF LEFT KNEE: ICD-10-CM

## 2021-03-03 DIAGNOSIS — M25.562 CHRONIC PAIN OF LEFT KNEE: ICD-10-CM

## 2021-03-03 DIAGNOSIS — M72.2 PLANTAR FASCIITIS OF RIGHT FOOT: Primary | ICD-10-CM

## 2021-03-03 PROCEDURE — 97140 MANUAL THERAPY 1/> REGIONS: CPT | Performed by: PHYSICAL THERAPIST

## 2021-03-03 PROCEDURE — 97110 THERAPEUTIC EXERCISES: CPT | Performed by: PHYSICAL THERAPIST

## 2021-03-03 NOTE — PROGRESS NOTES
Daily Note     Today's date: 3/3/2021  Patient name: Petra Bustillo  : 1975  MRN: [id number]  Referring provider: sIaiah Jose DO  Dx:   Encounter Diagnosis     ICD-10-CM    1  Plantar fasciitis of right foot  M72 2    2  Chronic pain of left knee  M25 562     G89 29                   Subjective: Patient reports continued HEP compliance and notes on/off symptoms in regards to his plantar fascia  Objective: See treatment diary below      Assessment: Reviewed J-brace and fitted appropriately  Moderate gastroc tightness remains along with plantar fascia  Good form and mechanics noted with glut medius strengthening and minimal valgus noted at the knee  Overall patient progressing very well  Plan: Continue per plan of care  Precautions: None      Manuals 1/19 2/3 2/17 3/3         R plantar fascia release  5' 5' 5'         R gastroc release  5' 5' 5'         R talocrural joint mobilization  Grade IV Grade IV Grade IV         L patella taping (medial glide)  performed performed J brace         Neuro Re-Ed                                                                                                        Ther Ex             Standing gastroc strech 4x30" on R 4x30" on R 4x30" R 4x30" R         Standing soleus stretch 4x30" on R 4x30" on R 4x30" R 4x30" R         R plantar fascia stretch 4x30" 4x30" 4x30" 4x30"         R plantar fascia release             SLR  3#  3x10 3#  3x10 3#  3x10         Standing hip abduction  BTB  3x10 B/L BTB  3x10 B/L BTB  3x10 B/L         Monster walks  BTB  10 steps x4 BTB  10 steps x4 BTB 10 steps x4                      Ther Activity                                       Gait Training                                       Modalities                                         Petra Bustillo attended physical therapy from 23 until 3/3/21 for 4 treatment sessions regarding right foot pain    Patient made improvement throughout treatment but am unable to achieve discharge information secondary to patient not returning for follow up appointments  Patient will be discharged from PT at this time  Thank you

## 2021-03-18 ENCOUNTER — HOSPITAL ENCOUNTER (OUTPATIENT)
Dept: NON INVASIVE DIAGNOSTICS | Facility: HOSPITAL | Age: 46
Discharge: HOME | End: 2021-03-18
Payer: COMMERCIAL

## 2021-03-18 DIAGNOSIS — R01.1 HEART MURMUR: ICD-10-CM

## 2021-03-18 PROCEDURE — 93306 TTE W/DOPPLER COMPLETE: CPT

## 2021-03-19 PROCEDURE — 93306 TTE W/DOPPLER COMPLETE: CPT | Performed by: INTERNAL MEDICINE

## 2021-03-24 DIAGNOSIS — R14.0 BLOATING: ICD-10-CM

## 2021-03-24 DIAGNOSIS — R10.84 GENERALIZED ABDOMINAL PAIN: Primary | ICD-10-CM

## 2021-04-13 DIAGNOSIS — Z23 ENCOUNTER FOR IMMUNIZATION: ICD-10-CM

## 2025-07-09 ENCOUNTER — OFFICE VISIT (OUTPATIENT)
Dept: FAMILY MEDICINE CLINIC | Facility: CLINIC | Age: 50
End: 2025-07-09
Payer: COMMERCIAL

## 2025-07-09 VITALS
BODY MASS INDEX: 26.55 KG/M2 | TEMPERATURE: 97.7 F | HEIGHT: 72 IN | OXYGEN SATURATION: 98 % | DIASTOLIC BLOOD PRESSURE: 80 MMHG | HEART RATE: 78 BPM | WEIGHT: 196 LBS | SYSTOLIC BLOOD PRESSURE: 130 MMHG

## 2025-07-09 DIAGNOSIS — H26.9 CATARACT OF LEFT EYE, UNSPECIFIED CATARACT TYPE: ICD-10-CM

## 2025-07-09 DIAGNOSIS — Z01.818 PRE-OP EXAMINATION: Primary | ICD-10-CM

## 2025-07-09 PROCEDURE — 99243 OFF/OP CNSLTJ NEW/EST LOW 30: CPT

## 2025-07-09 NOTE — PROGRESS NOTES
Pre-operative Clearance  Name: Aniceto Watson      : 1975      MRN: [id number]  Encounter Provider: Elsie Wiggins PA-C  Encounter Date: 2025   Encounter department: [facility]    :  Assessment & Plan  Pre-op examination  Patient presents today for preoperative clearance for upcoming surgery. Patient's medical history and medication list were reviewed and updated accordingly. Patient feels in their usual state of health with no acute concerns.     No EKG or labs needed as patient does not have any chronic medical conditions and is currently asymptomatic from a cardiopulmonary perspective.  Patient has no diagnosed medical conditions.  Patient has been feeling well with no recent acute illnesses or hospitalizations.    His physical examination is overall unremarkable, patient appears to be in good health for his age with stable vital signs.  Patient is in a low risk category for the planned procedure to as RCI class I.    Considering all of the above, I find patient to be medically optimized for low risk cataract repair with low risk of perioperative complications.       Cataract of left eye, unspecified cataract type  Recommended to have cataract repair from recommendations by their ophthalmologist. Has discussed risks and benefits of repair and would like to proceed.            Pre-operative Clearance:     Revised Cardiac Risk Index:  RCI RISK CLASS I (0 risk factors, risk of major cardiac complications approximately 0.5%)    Clearance:  Patient is medically optimized (CLEARED) for proposed surgery without any additional cardiac testing.      Medication Instructions:   - Avoid herbs or non-directed vitamins one week prior to surgery    - Avoid aspirin containing medications or non-steroidal anti-inflammatory drugs one week preceding surgery    - May take tylenol for pain up until the night before surgery         History of Present Illness     Pre-Op Examination     Surgery:  left cataract   Anticipated Date of Surgery: 7/21/2025   Surgeon: Dr. Mares     Patient presents today for preoperative clearance for upcoming surgery. Surgery information is as follows:     Previous Anesthesia: Yes  Complications from Anesthesia: No  Bleeding and Clotting Disorders: No   Blood Thinners: No  Pertinent PMH:    Cardiac Hx: None   Pulmonary Hx: None  Activity Tolerance (walk 4 blocks without issues, can walk up 2 flights of stairs): Yes  Home Safety (patient feels safe at home post-op): Yes    Smoking: No  Alcohol Use: No  Illicit Drug Use: No    Patient feels in their usual state of health with no acute concerns.      Previous history of bleeding disorders or clots?: No    Previous Anesthesia reaction?: No    Prolonged steroid use in the last 6 months?: No      Assessment of Cardiac Risk:   - Unstable or severe angina or MI in the last 6 weeks or history of stent placement in the last year?: No    - Decompensated heart failure (e.g. New onset heart failure, NYHA  Class IV heart failure, or worsening existing heart failure)?: No    - Significant arrhythmias such as high grade AV block, symptomatic ventricular arrhythmia, newly recognized ventricular tachycardia, supraventricular tachycardia with resting heart rate >100, or symptomatic bradycardia?: No    - Severe heart valve disease including aortic stenosis or symptomatic mitral stenosis?: No      Pre-operative Risk Factors:  - Elevated-risk surgery: No    - History of cerebrovascular disease: No    - History of ischemic heart disease: No    - History of congestive heart failure: No    - Pre-operative treatment with insulin: No    - Pre-operative creatinine >2 mg/dL: No      Review of Systems   Constitutional:  Negative for chills and fever.   HENT:  Negative for congestion, sinus pressure, sore throat and trouble swallowing.    Respiratory:  Negative for cough, chest tightness and shortness of breath.    Cardiovascular:  Negative for chest pain,  "palpitations and leg swelling.   Gastrointestinal:  Negative for abdominal pain, diarrhea, nausea and vomiting.   Genitourinary:  Negative for difficulty urinating, dysuria and hematuria.   Musculoskeletal:  Negative for arthralgias, back pain and myalgias.   Neurological:  Negative for dizziness, seizures, syncope, light-headedness and headaches.   Hematological:  Does not bruise/bleed easily.   Psychiatric/Behavioral:  Negative for behavioral problems and confusion. The patient is not nervous/anxious.    All other systems reviewed and are negative.    Past Medical History   Past Medical History[1]  Past Surgical History[2]  Family History[3]  Social History[4]  Medications[5]  No Known Allergies  Objective   /80 (BP Location: Left arm, Patient Position: Sitting, Cuff Size: Standard)   Pulse 78   Temp 97.7 °F (36.5 °C)   Ht 5' 11.5\" (1.816 m)   Wt 88.9 kg (196 lb)   SpO2 98%   BMI 26.96 kg/m²     Physical Exam  Vitals and nursing note reviewed.   Constitutional:       General: He is not in acute distress.     Appearance: Normal appearance. He is normal weight. He is not ill-appearing.   HENT:      Head: Normocephalic and atraumatic.      Right Ear: Tympanic membrane normal.      Left Ear: Tympanic membrane normal.      Nose: Nose normal.      Mouth/Throat:      Mouth: Mucous membranes are moist.      Pharynx: Oropharynx is clear. No oropharyngeal exudate or posterior oropharyngeal erythema.     Eyes:      Extraocular Movements: Extraocular movements intact.      Pupils: Pupils are equal, round, and reactive to light.     Neck:      Vascular: No carotid bruit.     Cardiovascular:      Rate and Rhythm: Normal rate and regular rhythm.      Heart sounds: No murmur heard.  Pulmonary:      Effort: Pulmonary effort is normal. No respiratory distress.      Breath sounds: Normal breath sounds.   Abdominal:      General: Bowel sounds are normal.     Musculoskeletal:         General: Normal range of motion.      " Cervical back: Normal range of motion and neck supple.      Right lower leg: No edema.      Left lower leg: No edema.   Lymphadenopathy:      Cervical: No cervical adenopathy.     Neurological:      General: No focal deficit present.      Mental Status: He is alert and oriented to person, place, and time. Mental status is at baseline.     Psychiatric:         Mood and Affect: Mood normal.         Behavior: Behavior normal.         Judgment: Judgment normal.           Elsie Wiggins PA-C         [1]  Past Medical History:  Diagnosis Date   • Kidney stone     In my chart   [2]  Past Surgical History:  Procedure Laterality Date   • WISDOM TOOTH EXTRACTION     [3]  No family history on file.[4]  Social History  Tobacco Use   • Smoking status: Former   • Smokeless tobacco: Former   Vaping Use   • Vaping status: Never Used   Substance and Sexual Activity   • Alcohol use: Yes     Alcohol/week: 1.0 - 4.0 standard drink of alcohol     Types: 1 - 4 Standard drinks or equivalent per week     Comment: Ocassionally & Socially   • Drug use: No   [5]  No current outpatient medications on file prior to visit.

## 2025-08-12 ENCOUNTER — OFFICE VISIT (OUTPATIENT)
Dept: FAMILY MEDICINE CLINIC | Facility: CLINIC | Age: 50
End: 2025-08-12
Payer: COMMERCIAL